# Patient Record
Sex: MALE | Race: BLACK OR AFRICAN AMERICAN
[De-identification: names, ages, dates, MRNs, and addresses within clinical notes are randomized per-mention and may not be internally consistent; named-entity substitution may affect disease eponyms.]

---

## 2018-12-29 ENCOUNTER — HOSPITAL ENCOUNTER (EMERGENCY)
Dept: HOSPITAL 92 - ERS | Age: 53
Discharge: HOME | End: 2018-12-29
Payer: SELF-PAY

## 2018-12-29 DIAGNOSIS — Z79.899: ICD-10-CM

## 2018-12-29 DIAGNOSIS — I10: ICD-10-CM

## 2018-12-29 DIAGNOSIS — H61.23: Primary | ICD-10-CM

## 2018-12-29 DIAGNOSIS — F17.210: ICD-10-CM

## 2018-12-29 PROCEDURE — 99282 EMERGENCY DEPT VISIT SF MDM: CPT

## 2019-09-17 ENCOUNTER — HOSPITAL ENCOUNTER (OUTPATIENT)
Dept: HOSPITAL 92 - BICRAD | Age: 54
Discharge: HOME | End: 2019-09-17
Payer: COMMERCIAL

## 2019-09-17 DIAGNOSIS — M19.042: ICD-10-CM

## 2019-09-17 DIAGNOSIS — Z02.71: Primary | ICD-10-CM

## 2019-09-17 NOTE — RAD
LEFT HAND TWO VIEWS:

 

HISTORY:

Disability evaluation.

 

FINDINGS:

Mild DJD at the first carpometacarpal.  Mild DJD at the radial carpal.  Minimal DJD at the MCP joints
.  Mild DJD at the tip joints with minimal spurring seen dorsally.

 

IMPRESSION:

Mild degenerative changes are seen, as described.

 

POS: OFF